# Patient Record
Sex: MALE | Race: WHITE | Employment: FULL TIME | URBAN - METROPOLITAN AREA
[De-identification: names, ages, dates, MRNs, and addresses within clinical notes are randomized per-mention and may not be internally consistent; named-entity substitution may affect disease eponyms.]

---

## 2018-08-20 ENCOUNTER — APPOINTMENT (OUTPATIENT)
Dept: GENERAL RADIOLOGY | Age: 28
End: 2018-08-20
Payer: COMMERCIAL

## 2018-08-20 ENCOUNTER — HOSPITAL ENCOUNTER (EMERGENCY)
Age: 28
Discharge: HOME | End: 2018-08-20
Attending: EMERGENCY MEDICINE
Payer: COMMERCIAL

## 2018-08-20 VITALS
TEMPERATURE: 98.3 F | WEIGHT: 170 LBS | SYSTOLIC BLOOD PRESSURE: 137 MMHG | DIASTOLIC BLOOD PRESSURE: 83 MMHG | BODY MASS INDEX: 22.53 KG/M2 | OXYGEN SATURATION: 100 % | RESPIRATION RATE: 16 BRPM | HEART RATE: 60 BPM | HEIGHT: 73 IN

## 2018-08-20 DIAGNOSIS — S90.30XA CONTUSION OF FOOT, UNSPECIFIED LATERALITY, INITIAL ENCOUNTER: Primary | ICD-10-CM

## 2018-08-20 PROCEDURE — 99283 EMERGENCY DEPT VISIT LOW MDM: CPT

## 2018-08-20 PROCEDURE — 6370000000 HC RX 637 (ALT 250 FOR IP): Performed by: EMERGENCY MEDICINE

## 2018-08-20 PROCEDURE — 73630 X-RAY EXAM OF FOOT: CPT

## 2018-08-20 RX ORDER — ACETAMINOPHEN 500 MG
1000 TABLET ORAL ONCE
Status: COMPLETED | OUTPATIENT
Start: 2018-08-20 | End: 2018-08-20

## 2018-08-20 RX ADMIN — ACETAMINOPHEN 1000 MG: 500 TABLET ORAL at 19:39

## 2018-08-20 ASSESSMENT — PAIN DESCRIPTION - DESCRIPTORS: DESCRIPTORS: SHARP

## 2018-08-20 ASSESSMENT — PAIN DESCRIPTION - PROGRESSION
CLINICAL_PROGRESSION: NOT CHANGED
CLINICAL_PROGRESSION: GRADUALLY IMPROVING

## 2018-08-20 ASSESSMENT — PAIN DESCRIPTION - LOCATION: LOCATION: FOOT

## 2018-08-20 ASSESSMENT — PAIN SCALES - GENERAL
PAINLEVEL_OUTOF10: 7
PAINLEVEL_OUTOF10: 8
PAINLEVEL_OUTOF10: 8

## 2018-08-20 ASSESSMENT — PAIN DESCRIPTION - FREQUENCY: FREQUENCY: INTERMITTENT

## 2018-08-20 ASSESSMENT — PAIN DESCRIPTION - ORIENTATION: ORIENTATION: RIGHT

## 2018-08-20 ASSESSMENT — PAIN DESCRIPTION - ONSET: ONSET: SUDDEN

## 2018-08-20 ASSESSMENT — PAIN DESCRIPTION - PAIN TYPE: TYPE: ACUTE PAIN

## 2018-08-20 NOTE — ED NOTES
Pt to ER with signif other, to room per wheelchair. Pt reports he rolled right ankle yesterday while stepping down from step, heard pop. Pt reports ankle swelling, bruising today, rates pain 8/10, reports taking motrin at 1600, minimal relief.  Pt calm, cooperative, respers even non labored, skin warm pink, no distress, here for eval.      Sonnie Mortimer, RN  08/20/18 [id number]

## 2018-08-20 NOTE — ED PROVIDER NOTES
100%.      Physical Exam   Constitutional: He is oriented to person, place, and time. He appears well-developed and well-nourished. No distress. HENT:   Head: Normocephalic and atraumatic. Right Ear: External ear normal.   Left Ear: External ear normal.   Nose: Nose normal.   Eyes: Pupils are equal, round, and reactive to light. Conjunctivae and EOM are normal. Right eye exhibits no discharge. Left eye exhibits no discharge. Neck: Normal range of motion. Neck supple. Cardiovascular: Normal rate, regular rhythm and normal heart sounds. No murmur heard. Pulmonary/Chest: Effort normal and breath sounds normal. No respiratory distress. He has no wheezes. He has no rales. He exhibits no tenderness. Abdominal: Soft. Bowel sounds are normal. He exhibits no distension. Musculoskeletal: Normal range of motion. He exhibits edema and tenderness. He exhibits no deformity. Patient does have some significant swelling to the right lateral malleolus. Tenderness to the inferior and posterior aspects of the malleolus. No pain over the fifth metatarsal or proximal fibular head. Neurological: He is alert and oriented to person, place, and time. He exhibits normal muscle tone. Coordination normal.   Skin: Skin is warm. No rash noted. He is not diaphoretic. Psychiatric: He has a normal mood and affect. His behavior is normal.       DIFFERENTIAL DIAGNOSIS/ MDM:     I did discuss x-rays with him and he is comfortable with plan of care. We also discussed RICE. He verbalizes understanding.     DIAGNOSTIC RESULTS     EKG: All EKG's are interpreted by the Emergency Department Physician who either signs or Co-signs this chart in the absence of a cardiologist.    None    RADIOLOGY:   Non-plain film images such as CT, Ultrasound and MRI are read by the radiologist. Plain radiographic images are visualized and the radiologist interpretations are reviewed as follows:     Interpretation per the Radiologist below, if available at the time of this note:    Xr Foot Right (min 3 Views)    Result Date: 8/20/2018  EXAMINATION: 3 XRAY VIEWS OF THE RIGHT FOOT 8/20/2018 7:43 pm COMPARISON: None. HISTORY: ORDERING SYSTEM PROVIDED HISTORY: pain TECHNOLOGIST PROVIDED HISTORY: pain Ordering Physician Provided Reason for Exam: Pt states lateral foot pain s/p rolling his foot yesterday. Bruising to the lateral aspect of the right foot. Pain generalized to lateral aspect. Denies surgeries or hx of fracture to right foot. Acuity: Acute Type of Exam: Initial FINDINGS: Mineralization and alignment are satisfactory. No acute fracture dislocation is noted. Mild degenerative changes are present in the 1st metatarsal-phalangeal joint. No acute osseous abnormality right foot. RECOMMENDATION: Follow-up studies as clinically indicated. LABS:  No results found for this visit on 08/20/18. None    EMERGENCY DEPARTMENT COURSE:   Vitals:    Vitals:    08/20/18 1918   BP: 137/83   Pulse: 60   Resp: 16   Temp: 98.3 °F (36.8 °C)   TempSrc: Oral   SpO2: 100%   Weight: 77.1 kg (170 lb)   Height: 6' 1\" (1.854 m)     -------------------------  BP: 137/83, Temp: 98.3 °F (36.8 °C), Pulse: 60, Resp: 16      RE-EVALUATION:  Patient is feeling improved on reexamination. We discussed the negative imaging. CONSULTS:  None    PROCEDURES:  None    FINAL IMPRESSION      1. Contusion of foot, unspecified laterality, initial encounter          DISPOSITION/PLAN   DISPOSITION  Home      CONDITION ON DISPOSITION:   Stable    PATIENT REFERRED TO:  PCP  within the next week          DISCHARGE MEDICATIONS:  There are no discharge medications for this patient. (Please note that portions of this note were completed with a voice recognition program.  Efforts were made to edit the dictations but occasionally words are mis-transcribed.)    Rodriguez MD, F.A.C.E.P.   Attending Emergency Medicine Physician        Jose Fuchs MD  08/24/18 [id number]